# Patient Record
Sex: MALE
[De-identification: names, ages, dates, MRNs, and addresses within clinical notes are randomized per-mention and may not be internally consistent; named-entity substitution may affect disease eponyms.]

---

## 2024-08-02 PROBLEM — Z00.00 ENCOUNTER FOR PREVENTIVE HEALTH EXAMINATION: Status: ACTIVE | Noted: 2024-08-02

## 2024-08-09 ENCOUNTER — LABORATORY RESULT (OUTPATIENT)
Age: 64
End: 2024-08-09

## 2024-08-09 ENCOUNTER — APPOINTMENT (OUTPATIENT)
Dept: CARDIOLOGY | Facility: CLINIC | Age: 64
End: 2024-08-09

## 2024-08-09 PROBLEM — Z86.73 HISTORY OF TRANSIENT ISCHEMIC ATTACK: Status: ACTIVE | Noted: 2024-08-09

## 2024-08-09 PROBLEM — I48.0 PAROXYSMAL ATRIAL FIBRILLATION: Status: ACTIVE | Noted: 2024-08-09

## 2024-08-09 PROBLEM — I25.10 ARTERIOSCLEROSIS OF CORONARY ARTERY: Status: ACTIVE | Noted: 2024-08-09

## 2024-08-09 PROBLEM — F17.200 CURRENT SMOKER: Status: ACTIVE | Noted: 2024-08-09

## 2024-08-09 PROBLEM — Z95.5 STATUS POST CORONARY ARTERY STENT PLACEMENT: Status: ACTIVE | Noted: 2024-08-09

## 2024-08-09 PROBLEM — Z78.9 DENIES ALCOHOL CONSUMPTION: Status: ACTIVE | Noted: 2024-08-09

## 2024-08-09 PROBLEM — R55 RECURRENT SYNCOPE: Status: ACTIVE | Noted: 2024-08-09

## 2024-08-09 PROBLEM — I10 ESSENTIAL HYPERTENSION: Status: ACTIVE | Noted: 2024-08-09

## 2024-08-09 PROBLEM — Z01.810 PRE-OPERATIVE CARDIOVASCULAR EXAMINATION: Status: ACTIVE | Noted: 2024-08-09

## 2024-08-09 PROCEDURE — 99407 BEHAV CHNG SMOKING > 10 MIN: CPT

## 2024-08-09 PROCEDURE — 99205 OFFICE O/P NEW HI 60 MIN: CPT | Mod: 25

## 2024-08-09 PROCEDURE — 93000 ELECTROCARDIOGRAM COMPLETE: CPT

## 2024-08-09 NOTE — PHYSICAL EXAM
[Well Developed] : well developed [No Acute Distress] : no acute distress [Normal Conjunctiva] : normal conjunctiva [Normal Venous Pressure] : normal venous pressure [No Carotid Bruit] : no carotid bruit [Normal S1, S2] : normal S1, S2 [No Murmur] : no murmur [No Rub] : no rub [No Gallop] : no gallop [Clear Lung Fields] : clear lung fields [Good Air Entry] : good air entry [No Respiratory Distress] : no respiratory distress  [Normal Gait] : normal gait [Gait - Sufficient for Exercise Testing] : gait - sufficient for exercise testing [No Edema] : no edema [No Clubbing] : no clubbing [Moves all extremities] : moves all extremities [Normal Speech] : normal speech [Memory Deficit] : memory deficit [Alert and Oriented] : alert and oriented

## 2024-08-12 NOTE — REASON FOR VISIT
[FreeTextEntry3] : [facility] [place] ([place]. [place]) [FreeTextEntry1] : ------------------------------------------------------------------------ LAN MARTINEZ is a 64 year old smoker with a history of CAD, HTN, and paroxysmal atrial fibrillation who is seen for pre-operative clearance for removal of a renal mass.  Prior Cancer Treatments: ------------------------------------------------------------------------ Chemo/targeted therapy: ------------------------------------------------------------------------ Surgery: ------------------------------------------------------------------------ Radiation:

## 2024-08-12 NOTE — REASON FOR VISIT
[FreeTextEntry3] : [facility] [place] (Dr. Sanchez) [FreeTextEntry1] : ------------------------------------------------------------------------ LAN MARTINEZ is a 64 year old smoker with a history of CAD, HTN, and paroxysmal atrial fibrillation who is seen for pre-operative clearance for removal of a renal mass.  Prior Cancer Treatments: ------------------------------------------------------------------------ Chemo/targeted therapy: ------------------------------------------------------------------------ Surgery: ------------------------------------------------------------------------ Radiation:

## 2024-08-12 NOTE — REVIEW OF SYSTEMS
[Dyspnea on exertion] : not dyspnea during exertion [Lower Ext Edema] : no extremity edema [Memory Lapses Or Loss] : memory lapses or loss [Anxiety] : anxiety [Under Stress] : under stress [Easy Bruising] : no tendency for easy bruising [FreeTextEntry2] : Reports constant fatigue over the last few months [FreeTextEntry5] : No current chest pain, history of stent placement 2 years ago [FreeTextEntry8] : renal mass, but no back pain, urinary symptoms or hematuria [FreeTextEntry9] : General aches and pains [de-identified] : History of TIAs and fainting episodes

## 2024-08-12 NOTE — REVIEW OF SYSTEMS
[Dyspnea on exertion] : not dyspnea during exertion [Lower Ext Edema] : no extremity edema [Memory Lapses Or Loss] : memory lapses or loss [Anxiety] : anxiety [Under Stress] : under stress [Easy Bruising] : no tendency for easy bruising [FreeTextEntry2] : Reports constant fatigue over the last few months [FreeTextEntry5] : No current chest pain, history of stent placement 2 years ago [FreeTextEntry8] : renal mass, but no back pain, urinary symptoms or hematuria [FreeTextEntry9] : General aches and pains [de-identified] : History of TIAs and fainting episodes

## 2024-08-12 NOTE — HISTORY OF PRESENT ILLNESS
[FreeTextEntry1] : The patient has cancer in the left kidney and requires cardiac clearance before potential surgery.  The patient is a long time cigarette smoker with hypertension who had a PCI in  at [facility] for chest pain. He subsequently experienced recurrent episodes of syncope and transient neurologic symptoms (deemed "TIAs"). He reports passing out approximately seven times over the past year, including the most recent episode on , which lasted two to three minutes. These episodes often begin with sudden blurriness and fading vision, followed by loss of consciousness without warning or incontinence. Despite extensive testing, the cause of the fainting remains undetermined.  The patient has also had four TIAs, one of which coincided with a fainting episode. He reports experiencing dysarthria and confusion (could not recall how to get back to his home while out with his son). The patient has a Medtronic loop recorder implanted since 2023 to monitor heart rhythm due to unexplained syncope and TIAs, but he is not aware if any arrhythmia or other events were recorded. However, he is currently on apixaban (possible for atrial fibrillation). He does not recall having an echocardiogram.  In , a CT scan showed a 2.4x6.2 cm mass in the upper pole of the left kidney. He was referred for robotic partial nephrectomy, but did not proceed. He since moved from [place] to [place] and [place] [facility] [place] Follow up imaging with  was recommended and he is scheduled for follow up with [facility] in a few weeks to determine how to proceed.   He reports no chest pain, shortness of breath, or palpitations currently. He reports being able to walk up a flight of stairs or for several blocks on level ground without any problems.   Past Medical History: - Left kidney cancer - Had PCI with a stent placed in  for a 99% blockage after presenting with chest pain - Four TIAs over the past year and a half - History of fainting episodes over the past year (separate from TIA symptoms) - right shoulder surgery  Medications: - Apixaban (Eliquis) twice a day - Amlodipine 5 mg daily for blood pressure - Clopidogrel - losartan 25 mg daily  Allergies: - No known allergies  Social History: - Smokes half a pack of cigarettes per day, quit in past for several years, but resumed due to stress - Former , currently not working -  with two grown children - Lives alone in [place] - Does not consume alcohol - No drug use  Cardiovascular Summary: ---------------------------------------------- EC2024: NSR 72 bpm, LAD, LVH, sepal infarct age undetermined ---------------------------------------------- Echo: ---------------------------------------------- Stress: ---------------------------------------------- CT/MRI ---------------------------------------------- Remote/ambulatory rhythm monitorin: ILR (Medtronic LINQ II) placed in [place] (no data)

## 2024-08-12 NOTE — HISTORY OF PRESENT ILLNESS
[FreeTextEntry1] : The patient has cancer in the left kidney and requires cardiac clearance before potential surgery.  The patient is a long time cigarette smoker with hypertension who had a PCI in  at [facility] for chest pain. He subsequently experienced recurrent episodes of syncope and transient neurologic symptoms (deemed "TIAs"). He reports passing out approximately seven times over the past year, including the most recent episode on , which lasted two to three minutes. These episodes often begin with sudden blurriness and fading vision, followed by loss of consciousness without warning or incontinence. Despite extensive testing, the cause of the fainting remains undetermined.  The patient has also had four TIAs, one of which coincided with a fainting episode. He reports experiencing dysarthria and confusion (could not recall how to get back to his home while out with his son). The patient has a Medtronic loop recorder implanted since 2023 to monitor heart rhythm due to unexplained syncope and TIAs, but he is not aware if any arrhythmia or other events were recorded. However, he is currently on apixaban (possible for atrial fibrillation). He does not recall having an echocardiogram.  In , a CT scan showed a 2.4x6.2 cm mass in the upper pole of the left kidney. He was referred for robotic partial nephrectomy, but did not proceed. He since moved from [place] to [place] and presented to [facility] [place]. Follow up imaging with  was recommended and he is scheduled for follow up with [facility] in a few weeks to determine how to proceed.   He reports no chest pain, shortness of breath, or palpitations currently. He reports being able to walk up a flight of stairs or for several blocks on level ground without any problems.   Past Medical History: - Left kidney cancer - Had PCI with a stent placed in  for a 99% blockage after presenting with chest pain - Four TIAs over the past year and a half - History of fainting episodes over the past year (separate from TIA symptoms) - right shoulder surgery  Medications: - Apixaban (Eliquis) twice a day - Amlodipine 5 mg daily for blood pressure - Clopidogrel - losartan 25 mg daily  Allergies: - No known allergies  Social History: - Smokes half a pack of cigarettes per day, quit in past for several years, but resumed due to stress - Former , currently not working -  with two grown children - Lives alone in [place] - Does not consume alcohol - No drug use  Cardiovascular Summary: ---------------------------------------------- EC2024: NSR 72 bpm, LAD, LVH, sepal infarct age undetermined ---------------------------------------------- Echo: ---------------------------------------------- Stress: ---------------------------------------------- CT/MRI ---------------------------------------------- Remote/ambulatory rhythm monitorin: ILR (Medtronic LINQ II) placed in [place] (no data)

## 2024-08-12 NOTE — HISTORY OF PRESENT ILLNESS
[FreeTextEntry1] : The patient has cancer in the left kidney and requires cardiac clearance before potential surgery.  The patient is a long time cigarette smoker with hypertension who had a PCI in  at [facility] for chest pain. He subsequently experienced recurrent episodes of syncope and transient neurologic symptoms (deemed "TIAs"). He reports passing out approximately seven times over the past year, including the most recent episode on , which lasted two to three minutes. These episodes often begin with sudden blurriness and fading vision, followed by loss of consciousness without warning or incontinence. Despite extensive testing, the cause of the fainting remains undetermined.  The patient has also had four TIAs, one of which coincided with a fainting episode. He reports experiencing dysarthria and confusion (could not recall how to get back to his home while out with his son). The patient has a Medtronic loop recorder implanted since 2023 to monitor heart rhythm due to unexplained syncope and TIAs, but he is not aware if any arrhythmia or other events were recorded. However, he is currently on apixaban (possible for atrial fibrillation). He does not recall having an echocardiogram.  In , a CT scan showed a 2.4x6.2 cm mass in the upper pole of the left kidney. He was referred for robotic partial nephrectomy, but did not proceed. He since moved from [place] to [place] and presented to [facility] for consultation. Follow up imaging with  was recommended and he is scheduled for follow up with [facility] in a few weeks to determine how to proceed.   He reports no chest pain, shortness of breath, or palpitations currently. He reports being able to walk up a flight of stairs or for several blocks on level ground without any problems.   Past Medical History: - Left kidney cancer - Had PCI with a stent placed in  for a 99% blockage after presenting with chest pain - Four TIAs over the past year and a half - History of fainting episodes over the past year (separate from TIA symptoms) - right shoulder surgery  Medications: - Apixaban (Eliquis) twice a day - Amlodipine 5 mg daily for blood pressure - Clopidogrel - losartan 25 mg daily  Allergies: - No known allergies  Social History: - Smokes half a pack of cigarettes per day, quit in past for several years, but resumed due to stress - Former , currently not working -  with two grown children - Lives alone in [place] - Does not consume alcohol - No drug use  Cardiovascular Summary: ---------------------------------------------- EC2024: NSR 72 bpm, LAD, LVH, sepal infarct age undetermined ---------------------------------------------- Echo: ---------------------------------------------- Stress: ---------------------------------------------- CT/MRI ---------------------------------------------- Remote/ambulatory rhythm monitorin: ILR (Medtronic LINQ II) placed in [place] (no data)

## 2024-08-12 NOTE — REASON FOR VISIT
[FreeTextEntry3] : [facility] [place] [place] (Dr. Sanchez) [FreeTextEntry1] : ------------------------------------------------------------------------ LAN MARTINEZ is a 64 year old smoker with a history of CAD, HTN, and paroxysmal atrial fibrillation who is seen for pre-operative clearance for removal of a renal mass.  Prior Cancer Treatments: ------------------------------------------------------------------------ Chemo/targeted therapy: ------------------------------------------------------------------------ Surgery: ------------------------------------------------------------------------ Radiation:

## 2024-08-12 NOTE — ASSESSMENT
[FreeTextEntry1] : ----------------------------------------- 64 year old man with CAD s/p PCI in 2022, recurrent TIA symptoms and unexplained syncope for which a loop recorder was implanted, hypertension, and renal cancer for which surgery is anticipated. Given significant cardiovascular disease history and symptoms, needs further evaluation to optimize management. In terms of functional capacity, he should be able to proceed with surgery.  Assessment: - History of unexplained fainting episodes and TIAs - Left kidney cancer with potential surgical intervention pending clearance - He is an intermediate risk patient for this intermediate risk procedure - Smoker with a significant history of CAD, not currently on a statin.  - Hypertension: Blood pressure elevated today on losartan 25 and amlodipine 5 - EKG: Normal rhythm with possible old septal infarct and LVH, which may reflect long-standing HTN - Family history of heart disease - paroxysmal atrial fibrillation on apixaban 5 mg BID  Recommendations: - an echocardiogram and exercise nuclear stress test for cardiac evaluation and risk stratification - Blood tests today for lipids, A1c, pro-BNP - start atorvastatin 80 mg daily given his cardiovascular history - continue other current medications - obtain further data re prior cardiac history and ILR - Schedule follow-up appointment in three months - Crucial he stops smoking  Advised patient on smoking cessation, considering past success with quitting and current health status. We discussed strategies, including anticipating challenges and preparing ahead of time. We discussed use of nicotine replacement as well. Above recommendations were reviewed with the patient and all questions were answered to the best of my ability and to his apparent satisfaction.  Preliminary pre-operative recommendations for surgery:  - may hold apixaban for 48 hours prior to surgery, resume once bleeding risk permits - may transition apixaban to enoxaparin post-operatively to reduce bleeding into  tract - continue losartan and amlodipine with small sip of water AM of the procedure - may hold clopidogrel for 5 days prior to surgery, and should resume after once bleeding risk permits

## 2024-08-12 NOTE — REVIEW OF SYSTEMS
[Dyspnea on exertion] : not dyspnea during exertion [Lower Ext Edema] : no extremity edema [Memory Lapses Or Loss] : memory lapses or loss [Anxiety] : anxiety [Under Stress] : under stress [Easy Bruising] : no tendency for easy bruising [FreeTextEntry2] : Reports constant fatigue over the last few months [FreeTextEntry5] : No current chest pain, history of stent placement 2 years ago [FreeTextEntry8] : renal mass, but no back pain, urinary symptoms or hematuria [FreeTextEntry9] : General aches and pains [de-identified] : History of TIAs and fainting episodes

## 2024-08-22 ENCOUNTER — RESULT REVIEW (OUTPATIENT)
Age: 64
End: 2024-08-22

## 2024-08-22 ENCOUNTER — APPOINTMENT (OUTPATIENT)
Dept: CV DIAGNOSITCS | Facility: HOSPITAL | Age: 64
End: 2024-08-22

## 2024-08-22 ENCOUNTER — APPOINTMENT (OUTPATIENT)
Dept: CV DIAGNOSTICS | Facility: HOSPITAL | Age: 64
End: 2024-08-22

## 2024-08-24 ENCOUNTER — NON-APPOINTMENT (OUTPATIENT)
Age: 64
End: 2024-08-24

## 2024-09-03 ENCOUNTER — NON-APPOINTMENT (OUTPATIENT)
Age: 64
End: 2024-09-03

## 2024-09-12 ENCOUNTER — TRANSCRIPTION ENCOUNTER (OUTPATIENT)
Age: 64
End: 2024-09-12

## 2024-09-12 ENCOUNTER — OUTPATIENT (OUTPATIENT)
Dept: OUTPATIENT SERVICES | Facility: HOSPITAL | Age: 64
LOS: 1 days | End: 2024-09-12
Payer: MEDICARE

## 2024-09-12 VITALS
DIASTOLIC BLOOD PRESSURE: 79 MMHG | OXYGEN SATURATION: 97 % | SYSTOLIC BLOOD PRESSURE: 141 MMHG | HEIGHT: 71 IN | HEART RATE: 57 BPM | TEMPERATURE: 98 F | WEIGHT: 214.07 LBS | RESPIRATION RATE: 16 BRPM

## 2024-09-12 VITALS
SYSTOLIC BLOOD PRESSURE: 150 MMHG | OXYGEN SATURATION: 94 % | DIASTOLIC BLOOD PRESSURE: 70 MMHG | HEART RATE: 55 BPM | RESPIRATION RATE: 16 BRPM

## 2024-09-12 DIAGNOSIS — R94.39 ABNORMAL RESULT OF OTHER CARDIOVASCULAR FUNCTION STUDY: ICD-10-CM

## 2024-09-12 LAB
ANION GAP SERPL CALC-SCNC: 11 MMOL/L — SIGNIFICANT CHANGE UP (ref 5–17)
ANION GAP SERPL CALC-SCNC: 13 MMOL/L — SIGNIFICANT CHANGE UP (ref 5–17)
BUN SERPL-MCNC: 24 MG/DL — HIGH (ref 7–23)
BUN SERPL-MCNC: 24 MG/DL — HIGH (ref 7–23)
CALCIUM SERPL-MCNC: 9.3 MG/DL — SIGNIFICANT CHANGE UP (ref 8.4–10.5)
CALCIUM SERPL-MCNC: 9.6 MG/DL — SIGNIFICANT CHANGE UP (ref 8.4–10.5)
CHLORIDE SERPL-SCNC: 103 MMOL/L — SIGNIFICANT CHANGE UP (ref 96–108)
CHLORIDE SERPL-SCNC: 106 MMOL/L — SIGNIFICANT CHANGE UP (ref 96–108)
CO2 SERPL-SCNC: 22 MMOL/L — SIGNIFICANT CHANGE UP (ref 22–31)
CO2 SERPL-SCNC: 23 MMOL/L — SIGNIFICANT CHANGE UP (ref 22–31)
CREAT SERPL-MCNC: 1.35 MG/DL — HIGH (ref 0.5–1.3)
CREAT SERPL-MCNC: 1.36 MG/DL — HIGH (ref 0.5–1.3)
EGFR: 58 ML/MIN/1.73M2 — LOW
EGFR: 58 ML/MIN/1.73M2 — LOW
GLUCOSE SERPL-MCNC: 128 MG/DL — HIGH (ref 70–99)
GLUCOSE SERPL-MCNC: 134 MG/DL — HIGH (ref 70–99)
HCT VFR BLD CALC: 43.1 % — SIGNIFICANT CHANGE UP (ref 39–50)
HGB BLD-MCNC: 14.4 G/DL — SIGNIFICANT CHANGE UP (ref 13–17)
MAGNESIUM SERPL-MCNC: 2 MG/DL — SIGNIFICANT CHANGE UP (ref 1.6–2.6)
MCHC RBC-ENTMCNC: 28.7 PG — SIGNIFICANT CHANGE UP (ref 27–34)
MCHC RBC-ENTMCNC: 33.4 GM/DL — SIGNIFICANT CHANGE UP (ref 32–36)
MCV RBC AUTO: 85.9 FL — SIGNIFICANT CHANGE UP (ref 80–100)
NRBC # BLD: 0 /100 WBCS — SIGNIFICANT CHANGE UP (ref 0–0)
PLATELET # BLD AUTO: 294 K/UL — SIGNIFICANT CHANGE UP (ref 150–400)
POTASSIUM SERPL-MCNC: 4.3 MMOL/L — SIGNIFICANT CHANGE UP (ref 3.5–5.3)
POTASSIUM SERPL-MCNC: 5 MMOL/L — SIGNIFICANT CHANGE UP (ref 3.5–5.3)
POTASSIUM SERPL-SCNC: 4.3 MMOL/L — SIGNIFICANT CHANGE UP (ref 3.5–5.3)
POTASSIUM SERPL-SCNC: 5 MMOL/L — SIGNIFICANT CHANGE UP (ref 3.5–5.3)
RBC # BLD: 5.02 M/UL — SIGNIFICANT CHANGE UP (ref 4.2–5.8)
RBC # FLD: 13.2 % — SIGNIFICANT CHANGE UP (ref 10.3–14.5)
SODIUM SERPL-SCNC: 138 MMOL/L — SIGNIFICANT CHANGE UP (ref 135–145)
SODIUM SERPL-SCNC: 140 MMOL/L — SIGNIFICANT CHANGE UP (ref 135–145)
WBC # BLD: 12.06 K/UL — HIGH (ref 3.8–10.5)
WBC # FLD AUTO: 12.06 K/UL — HIGH (ref 3.8–10.5)

## 2024-09-12 PROCEDURE — 36415 COLL VENOUS BLD VENIPUNCTURE: CPT

## 2024-09-12 PROCEDURE — 93454 CORONARY ARTERY ANGIO S&I: CPT | Mod: 26

## 2024-09-12 PROCEDURE — 85027 COMPLETE CBC AUTOMATED: CPT

## 2024-09-12 PROCEDURE — C1887: CPT

## 2024-09-12 PROCEDURE — 99152 MOD SED SAME PHYS/QHP 5/>YRS: CPT

## 2024-09-12 PROCEDURE — C1894: CPT

## 2024-09-12 PROCEDURE — 83735 ASSAY OF MAGNESIUM: CPT

## 2024-09-12 PROCEDURE — 93010 ELECTROCARDIOGRAM REPORT: CPT

## 2024-09-12 PROCEDURE — 93005 ELECTROCARDIOGRAM TRACING: CPT

## 2024-09-12 PROCEDURE — 80048 BASIC METABOLIC PNL TOTAL CA: CPT

## 2024-09-12 PROCEDURE — C1769: CPT

## 2024-09-12 PROCEDURE — 93454 CORONARY ARTERY ANGIO S&I: CPT

## 2024-09-12 RX ORDER — SODIUM CHLORIDE 9 MG/ML
1000 INJECTION INTRAMUSCULAR; INTRAVENOUS; SUBCUTANEOUS
Refills: 0 | Status: ACTIVE | OUTPATIENT
Start: 2024-09-12 | End: 2024-09-12

## 2024-09-12 RX ORDER — SODIUM CHLORIDE 9 MG/ML
250 INJECTION INTRAMUSCULAR; INTRAVENOUS; SUBCUTANEOUS ONCE
Refills: 0 | Status: COMPLETED | OUTPATIENT
Start: 2024-09-12 | End: 2024-09-12

## 2024-09-12 RX ORDER — AMLODIPINE BESYLATE 10 MG/1
1 TABLET ORAL
Refills: 0 | DISCHARGE

## 2024-09-12 RX ORDER — APIXABAN 5 MG/1
1 TABLET, FILM COATED ORAL
Qty: 0 | Refills: 0 | DISCHARGE

## 2024-09-12 RX ORDER — LOSARTAN POTASSIUM 50 MG/1
1 TABLET ORAL
Refills: 0 | DISCHARGE

## 2024-09-12 RX ADMIN — SODIUM CHLORIDE 500 MILLILITER(S): 9 INJECTION INTRAMUSCULAR; INTRAVENOUS; SUBCUTANEOUS at 10:35

## 2024-09-12 RX ADMIN — SODIUM CHLORIDE 75 MILLILITER(S): 9 INJECTION INTRAMUSCULAR; INTRAVENOUS; SUBCUTANEOUS at 10:35

## 2024-09-12 NOTE — H&P CARDIOLOGY - NSSUBSTANCEUSE_GEN_ALL_CORE_SD
Please let Mr Islas know his holter monitor showed Sinus Rhythm with rare ectopy. Average heart beat was 81. I would recommend keeping his f/u appt if he continues to have symptoms never used

## 2024-09-12 NOTE — ASU DISCHARGE PLAN (ADULT/PEDIATRIC) - CARE PROVIDER_API CALL
Agustín Tao  Cardiovascular Disease  [id number] [place] [address], [address]  [place] [address]  Phone: [phone]  Fax: [phone]  Follow Up Time: 2 weeks

## 2024-09-12 NOTE — H&P CARDIOLOGY - NSICDXPASTMEDICALHX_GEN_ALL_CORE_FT
PAST MEDICAL HISTORY:  CAD (coronary artery disease)     Cancer of left kidney     HTN (hypertension)     PAF (paroxysmal atrial fibrillation)     TIA (transient ischemic attack)

## 2024-09-12 NOTE — ASU PATIENT PROFILE, ADULT - FALL HARM RISK - HARM RISK INTERVENTIONS

## 2024-09-12 NOTE — H&P CARDIOLOGY - HISTORY OF PRESENT ILLNESS
Cardiologist: Dr. Agustín Figueroa  Pharmacy: [facility] ([address] [place] [address])    63 y/o male, current smoker, w/ PMHx of HTN, CAD s/p PCI 2022 at [facility], PAF (on Eliquis, last dose 9/5), "TIAs," +ILR, left kidney cancer initially presented to cardiologist for cardiac clearance prior to removal of renal mass. Pt underwent MPI which was abnormal (full results as below). In light of pt's risk factors, and abnormal MPI; pt referred to [facility] for recommended cardiac catheterization w/ possible intervention if clinically indicated. Denies headaches, changes in vision, dizziness, chest pain, palpitations, SOB/LOCKWOOD, abdominal pain, N/V/D, leg pain/edema, hematuria, BRBPR, melena or any other complaints.    Review of studies:  MPI 8/22/24: no significant ischemic ST segment changes beyond baseline abnormalities, normal LV regional wall motion; medium, mild-mod defect(s) in the anteroseptal and septalw alls that are reversible suggestive of ischemia; medium, mild-mod defect(s) in the inferior wall which normalizes w/ prone imaging suggestive of diaphragmatic attenuation artifact; post-stress LVEF 50%, occasional VPDs during stress and recovery

## 2024-09-12 NOTE — ASU DISCHARGE PLAN (ADULT/PEDIATRIC) - NS MD DC FALL RISK RISK
For information on Fall & Injury Prevention, visit: [email or web address] OR  [email or web address] OR  https://www.cdc.gov/steadi/patient.html

## 2024-09-16 DIAGNOSIS — M25.531 PAIN IN RIGHT WRIST: ICD-10-CM

## 2024-11-08 ENCOUNTER — APPOINTMENT (OUTPATIENT)
Dept: CARDIOLOGY | Facility: CLINIC | Age: 64
End: 2024-11-08